# Patient Record
Sex: MALE | Race: OTHER | HISPANIC OR LATINO
[De-identification: names, ages, dates, MRNs, and addresses within clinical notes are randomized per-mention and may not be internally consistent; named-entity substitution may affect disease eponyms.]

---

## 2019-04-25 ENCOUNTER — APPOINTMENT (OUTPATIENT)
Dept: ORTHOPEDIC SURGERY | Facility: CLINIC | Age: 24
End: 2019-04-25

## 2020-12-13 ENCOUNTER — EMERGENCY (EMERGENCY)
Facility: HOSPITAL | Age: 25
LOS: 1 days | Discharge: LEFT BEFORE DISCHARGE | End: 2020-12-13
Attending: EMERGENCY MEDICINE | Admitting: EMERGENCY MEDICINE
Payer: COMMERCIAL

## 2020-12-13 VITALS
DIASTOLIC BLOOD PRESSURE: 72 MMHG | HEART RATE: 70 BPM | RESPIRATION RATE: 20 BRPM | TEMPERATURE: 99 F | SYSTOLIC BLOOD PRESSURE: 169 MMHG | HEIGHT: 67 IN | OXYGEN SATURATION: 100 %

## 2020-12-13 PROCEDURE — 99283 EMERGENCY DEPT VISIT LOW MDM: CPT

## 2020-12-13 NOTE — ED ADULT TRIAGE NOTE - CHIEF COMPLAINT QUOTE
pt had recent travel to [place]. bought in with mother for wandering thought and inability to concentrate. As per mother patient took 5 edibles yesterday. pt denies auditory or visual hallucinators, SI,HI ,ETOH use. pt had negative rapid covid test 2 days ago.     Clementina (mother) [phone] pt had recent travel to [place]. bought in with mother for wandering thoughts and inability to concentrate. As per mother patient took 5 edibles yesterday. pt denies auditory or visual hallucinators, SI,HI ,ETOH use. pt had negative rapid covid test 2 days ago.     Clementina (mother) [phone]

## 2020-12-14 LAB
ALBUMIN SERPL ELPH-MCNC: 4.7 G/DL — SIGNIFICANT CHANGE UP (ref 3.3–5)
ALP SERPL-CCNC: 61 U/L — SIGNIFICANT CHANGE UP (ref 40–120)
ALT FLD-CCNC: 22 U/L — SIGNIFICANT CHANGE UP (ref 4–41)
ANION GAP SERPL CALC-SCNC: 14 MMOL/L — SIGNIFICANT CHANGE UP (ref 7–14)
APAP SERPL-MCNC: <15 UG/ML — SIGNIFICANT CHANGE UP (ref 15–25)
AST SERPL-CCNC: 19 U/L — SIGNIFICANT CHANGE UP (ref 4–40)
BASOPHILS # BLD AUTO: 0.05 K/UL — SIGNIFICANT CHANGE UP (ref 0–0.2)
BASOPHILS NFR BLD AUTO: 0.7 % — SIGNIFICANT CHANGE UP (ref 0–2)
BILIRUB SERPL-MCNC: 0.5 MG/DL — SIGNIFICANT CHANGE UP (ref 0.2–1.2)
BUN SERPL-MCNC: 12 MG/DL — SIGNIFICANT CHANGE UP (ref 7–23)
CALCIUM SERPL-MCNC: 9.7 MG/DL — SIGNIFICANT CHANGE UP (ref 8.4–10.5)
CHLORIDE SERPL-SCNC: 103 MMOL/L — SIGNIFICANT CHANGE UP (ref 98–107)
CO2 SERPL-SCNC: 25 MMOL/L — SIGNIFICANT CHANGE UP (ref 22–31)
CREAT SERPL-MCNC: 0.75 MG/DL — SIGNIFICANT CHANGE UP (ref 0.5–1.3)
EOSINOPHIL # BLD AUTO: 0.02 K/UL — SIGNIFICANT CHANGE UP (ref 0–0.5)
EOSINOPHIL NFR BLD AUTO: 0.3 % — SIGNIFICANT CHANGE UP (ref 0–6)
ETHANOL SERPL-MCNC: <10 MG/DL — SIGNIFICANT CHANGE UP
GLUCOSE SERPL-MCNC: 98 MG/DL — SIGNIFICANT CHANGE UP (ref 70–99)
HCT VFR BLD CALC: 41.5 % — SIGNIFICANT CHANGE UP (ref 39–50)
HGB BLD-MCNC: 13.5 G/DL — SIGNIFICANT CHANGE UP (ref 13–17)
IANC: 3.91 K/UL — SIGNIFICANT CHANGE UP (ref 1.5–8.5)
IMM GRANULOCYTES NFR BLD AUTO: 0.3 % — SIGNIFICANT CHANGE UP (ref 0–1.5)
LYMPHOCYTES # BLD AUTO: 2.2 K/UL — SIGNIFICANT CHANGE UP (ref 1–3.3)
LYMPHOCYTES # BLD AUTO: 32.6 % — SIGNIFICANT CHANGE UP (ref 13–44)
MCHC RBC-ENTMCNC: 27.5 PG — SIGNIFICANT CHANGE UP (ref 27–34)
MCHC RBC-ENTMCNC: 32.5 GM/DL — SIGNIFICANT CHANGE UP (ref 32–36)
MCV RBC AUTO: 84.5 FL — SIGNIFICANT CHANGE UP (ref 80–100)
MONOCYTES # BLD AUTO: 0.54 K/UL — SIGNIFICANT CHANGE UP (ref 0–0.9)
MONOCYTES NFR BLD AUTO: 8 % — SIGNIFICANT CHANGE UP (ref 2–14)
NEUTROPHILS # BLD AUTO: 3.91 K/UL — SIGNIFICANT CHANGE UP (ref 1.8–7.4)
NEUTROPHILS NFR BLD AUTO: 58.1 % — SIGNIFICANT CHANGE UP (ref 43–77)
NRBC # BLD: 0 /100 WBCS — SIGNIFICANT CHANGE UP
NRBC # FLD: 0 K/UL — SIGNIFICANT CHANGE UP
PLATELET # BLD AUTO: 250 K/UL — SIGNIFICANT CHANGE UP (ref 150–400)
POTASSIUM SERPL-MCNC: 3.7 MMOL/L — SIGNIFICANT CHANGE UP (ref 3.5–5.3)
POTASSIUM SERPL-SCNC: 3.7 MMOL/L — SIGNIFICANT CHANGE UP (ref 3.5–5.3)
PROT SERPL-MCNC: 8 G/DL — SIGNIFICANT CHANGE UP (ref 6–8.3)
RBC # BLD: 4.91 M/UL — SIGNIFICANT CHANGE UP (ref 4.2–5.8)
RBC # FLD: 13.4 % — SIGNIFICANT CHANGE UP (ref 10.3–14.5)
SALICYLATES SERPL-MCNC: <5 MG/DL — LOW (ref 15–30)
SARS-COV-2 IGG SERPL QL IA: NEGATIVE — SIGNIFICANT CHANGE UP
SARS-COV-2 IGM SERPL IA-ACNC: <0.1 INDEX — SIGNIFICANT CHANGE UP
SARS-COV-2 RNA SPEC QL NAA+PROBE: SIGNIFICANT CHANGE UP
SODIUM SERPL-SCNC: 142 MMOL/L — SIGNIFICANT CHANGE UP (ref 135–145)
TSH SERPL-MCNC: 1.25 UIU/ML — SIGNIFICANT CHANGE UP (ref 0.27–4.2)
WBC # BLD: 6.74 K/UL — SIGNIFICANT CHANGE UP (ref 3.8–10.5)
WBC # FLD AUTO: 6.74 K/UL — SIGNIFICANT CHANGE UP (ref 3.8–10.5)

## 2020-12-14 NOTE — ED ADULT NURSE NOTE - CHIEF COMPLAINT QUOTE
pt had recent travel to [place]. bought in with mother for wandering thoughts and inability to concentrate. As per mother patient took 5 edibles yesterday. pt denies auditory or visual hallucinators, SI,HI ,ETOH use. pt had negative rapid covid test 2 days ago.     Clementina (mother) [phone]

## 2020-12-14 NOTE — ED PROVIDER NOTE - OBJECTIVE STATEMENT
25M PMH cerebral palsy, depression presenting for depressive symptoms. States it began 7 days ago--feels similar to his prior depressive episodes although this one is more intense. Traveled to [place] about 1.5 wks ago and returned 1 weeks ago where he was ingesting marijuana edibles on a daily basis from a dispensary. Was taking about 10mg daily although yest took a much larger amount. Endorses suicidal ideation with passive thoughts but denies any plan; also denies any homicidal ideation. States he has "trouble understanding his thoughts." Denies auditory/visual/tactile hallucinations. Also states he is not compliant with his medications. Ingested kratum 2 days ago as well

## 2020-12-14 NOTE — ED ADULT NURSE REASSESSMENT NOTE - NS ED NURSE REASSESS COMMENT FT1
Pt eloped with mother. mother  and patient states that patient feels better and no longer needs to stay. MD Finney, MICHAEL Holbrook and ANA Ignacio made aware. pt nad.

## 2020-12-14 NOTE — ED ADULT NURSE REASSESSMENT NOTE - NS ED NURSE REASSESS COMMENT FT1
Pt eloped at this time. Pt left with mother and was seen leaving by other RN in department. Pt without IV. MD and charge RN made aware.

## 2020-12-14 NOTE — ED PROVIDER NOTE - NS ED ROS FT
CONST: no fevers, no chills  EYES: , no vision changes  ENT: no sore throat  CV: no chest pain, no leg swelling  RESP: no shortness of breath, no cough  ABD: no abdominal pain, no nausea, no vomiting, no diarrhea  : no dysuria, no flank pain, no hematuria  MSK: no back pain, no extremity pain  NEURO: no headache or additional neurologic complaints  PSYCH: +depressive symptoms  SKIN:  no rash

## 2020-12-14 NOTE — ED PROVIDER NOTE - PHYSICAL EXAMINATION
Physical Exam:  Gen: NAD, non-toxic appearing, awake alert   Head: NCAT  HEENT: , normal conjunctiva, oral mucosa moist  Lung: CTAB, no respiratory distress, no wheezes/rhonchi/rales B/L, speaking in full sentences  CV: RRR, no murmurs, rubs or gallops  Abd: soft, NT, ND, no guarding, no rigidity, no rebound tenderness, no CVA tenderness   MSK: no visible deformities, ROM normal in UE/LE, no spinal tenderness   Neuro: No focal sensory or motor deficits  Skin: Warm, well perfused, no rash, no leg swelling  Psych: flat affect  ~Simón Valdes MD (PGY-1)

## 2020-12-14 NOTE — ED PROVIDER NOTE - ATTENDING CONTRIBUTION TO CARE
25 year old presents with mother for psych eval. per mother patient has been abusing edibles and kratom. no acute SI or HI.  will medically clear and consult psych.

## 2020-12-14 NOTE — ED ADULT NURSE NOTE - NS ED NURSE ELOPE COMMENTS
Pt eltaylor at 0250, was seen leaving by other RN in the department seen leaving with mother and driving away. ANM and physician aware at this time.

## 2020-12-14 NOTE — ED PROVIDER NOTE - PROGRESS NOTE DETAILS
patient left with mother without psych eval. will attempt to reach patient and mother to return to the ED to complete eval. Lucio STRANGE (PGY-1)   was informed by nurse in RW that patient and his mother were seen leaving ED. Called pt's cell # and spoke with both pt and his mother that I recommend they return to ED for further evaluation and management. They both stated pt felt safe to go home and that he felt better

## 2020-12-14 NOTE — ED ADULT NURSE NOTE - OBJECTIVE STATEMENT
Pt received to room 23 A&Ox4. PMHx of depression, presenting today with symptoms of depression. Pt states symptoms began 7 days ago. Pt denies SI and Hi at this time. States he ingested marijuana on a daily basis for the last 5 days. Pt denies any hallucinations at this time. Labs drawn as ordered. Will continue to monitor

## 2020-12-14 NOTE — ED PROVIDER NOTE - CLINICAL SUMMARY MEDICAL DECISION MAKING FREE TEXT BOX
25M presenting for depressive episodes in setting of marijuana edible use and kratum use  No acute findings on exam; pt has flat affect and endorses deprsessive symptoms that feel similar to his prior episodes  Plan: labs, ecg, serum, urine tox, psych consult

## 2022-05-08 ENCOUNTER — NON-APPOINTMENT (OUTPATIENT)
Age: 27
End: 2022-05-08

## 2023-05-12 ENCOUNTER — EMERGENCY (EMERGENCY)
Facility: HOSPITAL | Age: 28
LOS: 1 days | Discharge: HOME | End: 2023-05-12
Admitting: STUDENT IN AN ORGANIZED HEALTH CARE EDUCATION/TRAINING PROGRAM
Payer: COMMERCIAL

## 2023-05-12 VITALS
HEART RATE: 90 BPM | OXYGEN SATURATION: 100 % | TEMPERATURE: 98 F | SYSTOLIC BLOOD PRESSURE: 115 MMHG | RESPIRATION RATE: 16 BRPM | DIASTOLIC BLOOD PRESSURE: 72 MMHG

## 2023-05-12 DIAGNOSIS — F25.9 SCHIZOAFFECTIVE DISORDER, UNSPECIFIED: ICD-10-CM

## 2023-05-12 LAB
ALBUMIN SERPL ELPH-MCNC: 4.9 G/DL — SIGNIFICANT CHANGE UP (ref 3.3–5)
ALP SERPL-CCNC: 65 U/L — SIGNIFICANT CHANGE UP (ref 40–120)
ALT FLD-CCNC: 30 U/L — SIGNIFICANT CHANGE UP (ref 4–41)
AMPHET UR-MCNC: NEGATIVE — SIGNIFICANT CHANGE UP
ANION GAP SERPL CALC-SCNC: 15 MMOL/L — HIGH (ref 7–14)
APAP SERPL-MCNC: <10 UG/ML — LOW (ref 15–25)
APPEARANCE UR: CLEAR — SIGNIFICANT CHANGE UP
AST SERPL-CCNC: 26 U/L — SIGNIFICANT CHANGE UP (ref 4–40)
B PERT DNA SPEC QL NAA+PROBE: SIGNIFICANT CHANGE UP
B PERT+PARAPERT DNA PNL SPEC NAA+PROBE: SIGNIFICANT CHANGE UP
BARBITURATES UR SCN-MCNC: NEGATIVE — SIGNIFICANT CHANGE UP
BASOPHILS # BLD AUTO: 0.05 K/UL — SIGNIFICANT CHANGE UP (ref 0–0.2)
BASOPHILS NFR BLD AUTO: 0.9 % — SIGNIFICANT CHANGE UP (ref 0–2)
BENZODIAZ UR-MCNC: NEGATIVE — SIGNIFICANT CHANGE UP
BILIRUB SERPL-MCNC: 1.4 MG/DL — HIGH (ref 0.2–1.2)
BILIRUB UR-MCNC: NEGATIVE — SIGNIFICANT CHANGE UP
BORDETELLA PARAPERTUSSIS (RAPRVP): SIGNIFICANT CHANGE UP
BUN SERPL-MCNC: 9 MG/DL — SIGNIFICANT CHANGE UP (ref 7–23)
C PNEUM DNA SPEC QL NAA+PROBE: SIGNIFICANT CHANGE UP
CALCIUM SERPL-MCNC: 9.9 MG/DL — SIGNIFICANT CHANGE UP (ref 8.4–10.5)
CHLORIDE SERPL-SCNC: 100 MMOL/L — SIGNIFICANT CHANGE UP (ref 98–107)
CO2 SERPL-SCNC: 24 MMOL/L — SIGNIFICANT CHANGE UP (ref 22–31)
COCAINE METAB.OTHER UR-MCNC: NEGATIVE — SIGNIFICANT CHANGE UP
COLOR SPEC: YELLOW — SIGNIFICANT CHANGE UP
CREAT SERPL-MCNC: 0.55 MG/DL — SIGNIFICANT CHANGE UP (ref 0.5–1.3)
CREATININE URINE RESULT, DAU: 147 MG/DL — SIGNIFICANT CHANGE UP
DIFF PNL FLD: NEGATIVE — SIGNIFICANT CHANGE UP
EGFR: 138 ML/MIN/1.73M2 — SIGNIFICANT CHANGE UP
EOSINOPHIL # BLD AUTO: 0.04 K/UL — SIGNIFICANT CHANGE UP (ref 0–0.5)
EOSINOPHIL NFR BLD AUTO: 0.7 % — SIGNIFICANT CHANGE UP (ref 0–6)
ETHANOL SERPL-MCNC: <10 MG/DL — SIGNIFICANT CHANGE UP
FLUAV SUBTYP SPEC NAA+PROBE: SIGNIFICANT CHANGE UP
FLUBV RNA SPEC QL NAA+PROBE: SIGNIFICANT CHANGE UP
GLUCOSE SERPL-MCNC: 99 MG/DL — SIGNIFICANT CHANGE UP (ref 70–99)
GLUCOSE UR QL: NEGATIVE — SIGNIFICANT CHANGE UP
HADV DNA SPEC QL NAA+PROBE: SIGNIFICANT CHANGE UP
HCOV 229E RNA SPEC QL NAA+PROBE: SIGNIFICANT CHANGE UP
HCOV HKU1 RNA SPEC QL NAA+PROBE: SIGNIFICANT CHANGE UP
HCOV NL63 RNA SPEC QL NAA+PROBE: SIGNIFICANT CHANGE UP
HCOV OC43 RNA SPEC QL NAA+PROBE: SIGNIFICANT CHANGE UP
HCT VFR BLD CALC: 42.4 % — SIGNIFICANT CHANGE UP (ref 39–50)
HGB BLD-MCNC: 13.8 G/DL — SIGNIFICANT CHANGE UP (ref 13–17)
HMPV RNA SPEC QL NAA+PROBE: SIGNIFICANT CHANGE UP
HPIV1 RNA SPEC QL NAA+PROBE: SIGNIFICANT CHANGE UP
HPIV2 RNA SPEC QL NAA+PROBE: SIGNIFICANT CHANGE UP
HPIV3 RNA SPEC QL NAA+PROBE: SIGNIFICANT CHANGE UP
HPIV4 RNA SPEC QL NAA+PROBE: SIGNIFICANT CHANGE UP
IANC: 2.86 K/UL — SIGNIFICANT CHANGE UP (ref 1.8–7.4)
IMM GRANULOCYTES NFR BLD AUTO: 0.7 % — SIGNIFICANT CHANGE UP (ref 0–0.9)
KETONES UR-MCNC: ABNORMAL
LEUKOCYTE ESTERASE UR-ACNC: NEGATIVE — SIGNIFICANT CHANGE UP
LYMPHOCYTES # BLD AUTO: 2.27 K/UL — SIGNIFICANT CHANGE UP (ref 1–3.3)
LYMPHOCYTES # BLD AUTO: 39.3 % — SIGNIFICANT CHANGE UP (ref 13–44)
M PNEUMO DNA SPEC QL NAA+PROBE: SIGNIFICANT CHANGE UP
MCHC RBC-ENTMCNC: 27.3 PG — SIGNIFICANT CHANGE UP (ref 27–34)
MCHC RBC-ENTMCNC: 32.5 GM/DL — SIGNIFICANT CHANGE UP (ref 32–36)
MCV RBC AUTO: 83.8 FL — SIGNIFICANT CHANGE UP (ref 80–100)
METHADONE UR-MCNC: NEGATIVE — SIGNIFICANT CHANGE UP
MONOCYTES # BLD AUTO: 0.51 K/UL — SIGNIFICANT CHANGE UP (ref 0–0.9)
MONOCYTES NFR BLD AUTO: 8.8 % — SIGNIFICANT CHANGE UP (ref 2–14)
NEUTROPHILS # BLD AUTO: 2.86 K/UL — SIGNIFICANT CHANGE UP (ref 1.8–7.4)
NEUTROPHILS NFR BLD AUTO: 49.6 % — SIGNIFICANT CHANGE UP (ref 43–77)
NITRITE UR-MCNC: NEGATIVE — SIGNIFICANT CHANGE UP
NRBC # BLD: 0 /100 WBCS — SIGNIFICANT CHANGE UP (ref 0–0)
NRBC # FLD: 0 K/UL — SIGNIFICANT CHANGE UP (ref 0–0)
OPIATES UR-MCNC: NEGATIVE — SIGNIFICANT CHANGE UP
OXYCODONE UR-MCNC: NEGATIVE — SIGNIFICANT CHANGE UP
PCP SPEC-MCNC: SIGNIFICANT CHANGE UP
PCP UR-MCNC: NEGATIVE — SIGNIFICANT CHANGE UP
PH UR: 7 — SIGNIFICANT CHANGE UP (ref 5–8)
PLATELET # BLD AUTO: 274 K/UL — SIGNIFICANT CHANGE UP (ref 150–400)
POTASSIUM SERPL-MCNC: 3.8 MMOL/L — SIGNIFICANT CHANGE UP (ref 3.5–5.3)
POTASSIUM SERPL-SCNC: 3.8 MMOL/L — SIGNIFICANT CHANGE UP (ref 3.5–5.3)
PROT SERPL-MCNC: 7.8 G/DL — SIGNIFICANT CHANGE UP (ref 6–8.3)
PROT UR-MCNC: ABNORMAL
RAPID RVP RESULT: SIGNIFICANT CHANGE UP
RBC # BLD: 5.06 M/UL — SIGNIFICANT CHANGE UP (ref 4.2–5.8)
RBC # FLD: 13.7 % — SIGNIFICANT CHANGE UP (ref 10.3–14.5)
RSV RNA SPEC QL NAA+PROBE: SIGNIFICANT CHANGE UP
RV+EV RNA SPEC QL NAA+PROBE: SIGNIFICANT CHANGE UP
SALICYLATES SERPL-MCNC: <0.3 MG/DL — LOW (ref 15–30)
SARS-COV-2 RNA SPEC QL NAA+PROBE: SIGNIFICANT CHANGE UP
SODIUM SERPL-SCNC: 139 MMOL/L — SIGNIFICANT CHANGE UP (ref 135–145)
SP GR SPEC: 1.02 — SIGNIFICANT CHANGE UP (ref 1.01–1.05)
THC UR QL: POSITIVE
TOXICOLOGY SCREEN, DRUGS OF ABUSE, SERUM RESULT: SIGNIFICANT CHANGE UP
TSH SERPL-MCNC: 0.61 UIU/ML — SIGNIFICANT CHANGE UP (ref 0.27–4.2)
UROBILINOGEN FLD QL: SIGNIFICANT CHANGE UP
WBC # BLD: 5.77 K/UL — SIGNIFICANT CHANGE UP (ref 3.8–10.5)
WBC # FLD AUTO: 5.77 K/UL — SIGNIFICANT CHANGE UP (ref 3.8–10.5)

## 2023-05-12 PROCEDURE — 90792 PSYCH DIAG EVAL W/MED SRVCS: CPT

## 2023-05-12 PROCEDURE — 93010 ELECTROCARDIOGRAM REPORT: CPT

## 2023-05-12 PROCEDURE — 99285 EMERGENCY DEPT VISIT HI MDM: CPT

## 2023-05-12 NOTE — ED PROVIDER NOTE - CLINICAL SUMMARY MEDICAL DECISION MAKING FREE TEXT BOX
28 yo M with PMH of cerebral palsy, anxiety and depression, presents to ED c/o suicidal thoughts. Denies any current SI/HI. Plan: psych clearance lab, psych consult and reassess.

## 2023-05-12 NOTE — ED ADULT NURSE NOTE - OBJECTIVE STATEMENT
Received pt in  pt calm & cooperative c/o depression, anxiety & insomnia pt denies si/hi presently safety & comfort measures maintained eval on going.

## 2023-05-12 NOTE — ED PROVIDER NOTE - PATIENT PORTAL LINK FT
You can access the FollowMyHealth Patient Portal offered by [facility] by registering at the following website: [email or web address]. By joining [facility]’s FollowMyHealth portal, you will also be able to view your health information using other applications (apps) compatible with our system.

## 2023-05-12 NOTE — ED BEHAVIORAL HEALTH ASSESSMENT NOTE - SAFETY PLAN ADDT'L DETAILS
Safety plan discussed with.../Education provided regarding environmental safety / lethal means restriction/Provision of National Suicide Prevention Lifeline 3-369-946-PYQH (4666)

## 2023-05-12 NOTE — ED BEHAVIORAL HEALTH ASSESSMENT NOTE - RISK ASSESSMENT
Pt is at chronically elevated risk of harm to self given history of suicide attempt, chronic mental illness, history of recent discharge from a psychiatric facility.   Protective factors include no violence history, medication compliance, no access to guns, no substance abuse, supportive family, willingness to seek help, no suicidal ideation or homicidal ideation, identifies reasons for living, future-oriented.   Pt is not at acutely elevated risk of harm to self or others.

## 2023-05-12 NOTE — ED ADULT TRIAGE NOTE - CHIEF COMPLAINT QUOTE
Pt with hx of depression, anxiety c/o suicidal thoughts. Pt recently admitted to psych facility for 2 weeks. Pt denies plan, hallucinations at this time. States "If I don't sleep soon the voices will start."

## 2023-05-12 NOTE — ED BEHAVIORAL HEALTH ASSESSMENT NOTE - SUMMARY
27 year old male with h/o Schizoaffective Disorder, multiple past psych admissions (most recently at [facility] x 2 weeks---discharged 5/10/23), scheduled for intake at [facility] on 5/16/23, +h/o suicide attempt via overdose in April 2023, no h/o violence or legal issues, PMH cerebral palsy, no h/o substance abuse, brought in by mother for difficulty sleeping x 1-2 nights and possible SI.   Patient reports anxiety and poor sleep x 1-2 days. States he was concerned that he would develop SI if he continues to have difficulty sleeping, but he denies any current SI/denies any SI since discharge from the hospital on 5/10/23. He adamantly denies suicidal ideation, intent, or plan. He is future-oriented, identifies reasons for living, and engages in safety planning. He does not appear acutely psychotic, manic, or depressed. He denies homicidal or violent ideation, intent, or plan. He is cooperative and in good behavioral control throughout ED course. Pt does not present an acute danger to self or others and does not meet criteria for involuntary psychiatric admission at this time. Pt declines voluntary psychiatric admission at this time.

## 2023-05-12 NOTE — ED PROVIDER NOTE - NSFOLLOWUPINSTRUCTIONS_ED_ALL_ED_FT
Rest, drink plenty of fluids.  Advance activity as tolerated.  Continue all previously prescribed medications as directed.  Follow up with your primary care physician and psychiatrist in 48-72 hours- bring copies of your results.  Return to the ER for worsening or persistent symptoms, and/or ANY NEW OR CONCERNING SYMPTOMS. If you have issues obtaining follow up, please call: [phone] ([phone] to obtain a doctor or specialist who takes your insurance in your area.

## 2023-05-12 NOTE — ED BEHAVIORAL HEALTH ASSESSMENT NOTE - HPI (INCLUDE ILLNESS QUALITY, SEVERITY, DURATION, TIMING, CONTEXT, MODIFYING FACTORS, ASSOCIATED SIGNS AND SYMPTOMS)
27 year old male with h/o Schizoaffective Disorder, multiple past psych admissions (most recently at [facility] x 2 weeks---discharged 5/10/23), scheduled for intake at [facility] on 5/16/23, +h/o suicide attempt via overdose in April 2023, no h/o violence or legal issues, PMH cerebral palsy, no h/o substance abuse, brought in by mother for difficulty sleeping x 1-2 nights and possible SI.     Patient states he was discharged from [facility] two days ago (5/10/23). States that prior to that admission, he experienced prolonged period of insomnia and then developed SI. Patient states he asked his mother to bring him to the ED today (5/12/23) due to anxiety and poor sleep x 1-2 days. States he was concerned that he would develop SI (as he did prior to [facility] hospitalization) if he continues to have difficulty sleeping. However, he denies any current SI/denies any SI since discharge from the hospital on 5/10/23. He adamantly denies suicidal ideation, intent, or plan. States he intends to go to his intake appointment on Tuesday (5/16/23) at [facility] partial hospitalization program. Pt denies homicidal or violent ideation, intent, or plan. He denies paranoia, no delusional content elicited. He denies AH/VH. He denies manic symptoms. He denies persistently depressed mood or anhedonia. He reports compliance with Risperdal, Depakote, Cogentin. He denies substance use.   See  note for collateral from mother.

## 2023-05-12 NOTE — ED BEHAVIORAL HEALTH NOTE - BEHAVIORAL HEALTH NOTE
As per request of the provider, writer contacted patient’s Mom #[phone] Clementina to obtain collateral information.  Writer left a vm requesting a return call. As per request of the provider, writer contacted patient’s Mom #[phone] Clementina to obtain collateral information.  Writer left a  requesting a return call.    mother returned the call and provided the following information.    Patient is a 26 Yo male domiciled w/ mother, single, no children, hx of schizoaffective disorder, anxiety, not working or studying,  bib mother due to patient stating he did not feel safe and that he wouldn’t be okay today. She describes the patient as open and honest. She reports that patient was discharged from [facility] on 05/10/2023 after a 2 week admission. Since the discharge he has struggled to sleep which she identifies as a trigger for the patient going into dark thoughts which include ending his life. She reports patient had a suicide attempt and psych admission at [facility] in April after overdosing on pills . She reports patient does not endorse any AVH, paranoia or delusions. She reports patient is not connected to a psychiatrist or therapist currently. She reports the patient medication includes divalproex 500mg, benztropine .5mg and she thinks risperidone (unknown dosage) but says the patient can confirm medication. She reports patient received Ativan PRN while in the hospital. She reports the patient had multiple psych admissions while studying at [employer] [facility] [employer] 5+ years ago. She reports at baseline, patient would  play the piano, play games and run marathons. She reports he always had a social phobia but would interact. For the past 2 months patient has a bizarre head movement. Medical problems include  cerebral palsy and patient was born premature. She reports the patient has a twin. She reports patient is covid vaccinated and has no recent travel or exposure. She reports there is a family hx of mental illness on the patient’s father side. Patient does not have access to fire arms or weapons. mother is advocating for psych admission. Mother reports they almost called the ambulance last night because the patient had dry mouth, couldn’t swallow and couldn’t sleep. As per request of the provider, writer contacted patient’s Mom #[phone] Clementina to obtain collateral information.  Writer left a  requesting a return call.    mother returned the call and provided the following information.    Patient is a 28 Yo male domiciled w/ mother, single, no children, hx of schizoaffective disorder, anxiety, not working or studying,  bib mother due to patient stating he did not feel safe and that he wouldn’t be okay today. She describes the patient as open and honest. She reports that patient was discharged from [facility] on 05/10/2023 after a 2 week admission. Since the discharge he has struggled to sleep which she identifies as a trigger for the patient going into dark thoughts which include ending his life. She reports patient had a suicide attempt and psych admission at [facility] in April after overdosing on pills . She reports patient does not endorse any AVH, paranoia or delusions. She reports patient is not connected to a psychiatrist or therapist currently. She reports the patient medication includes divalproex 500mg, benztropine .5mg and she thinks risperidone (unknown dosage) but says the patient can confirm medication. She reports patient received Ativan PRN while in the hospital. She reports the patient had multiple psych admissions while studying at [employer] [facility] [employer] 5+ years ago. She reports at baseline, patient would  play the piano, play games and run marathons. She reports he always had a social phobia but would interact. For the past 2 months patient has a bizarre head movement. Medical problems include  cerebral palsy and patient was born premature. She reports the patient has a twin. She reports patient is covid vaccinated and has no recent travel or exposure. She reports there is a family hx of mental illness on the patient’s father side. Patient does not have access to fire arms or weapons. mother is advocating for psych admission. Mother reports they almost called the ambulance last night because the patient had dry mouth, couldn’t swallow and couldn’t sleep.    Writer informed patient's mother that patient was cleared for discharge. mother comfortable scheduling uber for the patient upon discharge. She confirmed patient has an apt w/ PHP program on tuesday. She is comfortable checking with the patient , safeguarding home and utilizing mobile crisis/ or returning to ED if symptoms worsen. Adequate: hears normal conversation without difficulty

## 2023-05-12 NOTE — ED PROVIDER NOTE - OBJECTIVE STATEMENT
26 yo M with PMH of cerebral palsy, anxiety and depression, presents to ED c/o suicidal thoughts. Reports he was recently discharge from [facility], taking his medication, but when he can't sleep, he develops suicidal thoughts. Denies any current SI, HI.

## 2023-05-12 NOTE — ED BEHAVIORAL HEALTH ASSESSMENT NOTE - DESCRIPTION
denies calm cooperative, in behavioral control throughout ED course  Vital Signs Last 24 Hrs  T(C): 36.7 (12 May 2023 15:00), Max: 36.7 (12 May 2023 15:00)  T(F): 98.1 (12 May 2023 15:00), Max: 98.1 (12 May 2023 15:00)  HR: 90 (12 May 2023 15:00) (90 - 90)  BP: 115/72 (12 May 2023 15:00) (115/72 - 115/72)  BP(mean): --  RR: 16 (12 May 2023 15:00) (16 - 16)  SpO2: 100% (12 May 2023 15:00) (100% - 100%) see HPI

## 2023-05-12 NOTE — ED BEHAVIORAL HEALTH ASSESSMENT NOTE - DETAILS
history of mental illness on father's side Spoke to mother informed mother see HPI call 911 or return to ED if symptoms worsen or if pt develops thoughts of harming self or others

## 2023-12-11 NOTE — ED ADULT TRIAGE NOTE - MEANS OF ARRIVAL
Medication Question or Refill    Contacts         Type Contact Phone/Fax    12/11/2023 01:05 PM CST Phone (Incoming) Presley Calvertphone (Emergency Contact) [phone]     Refill request HYDROcodone-acetaminophen (NORCO) 5-325 MG tablet            What medication are you calling about (include dose and sig)?: Refill request HYDROcodone-acetaminophen (NORCO) 5-325 MG tablet     Preferred Pharmacy:   [facility]  [address]  [facility] [address]  Phone: [phone] Fax: [phone] Alternate Fax: [phone]    [facility]  [address] [address]  Phone: [phone] Fax: [phone]    [id number] - [place], [place] - [address]  [address] [address]  Phone: [phone] Fax: [phone]    [facility] - [place], [place] - [address]  [address]  [facility] [address]  Phone: [phone] Fax: [phone]    [facility] [place], [place] - [address]  [address]  [address]  [facility] [address]  Phone: [phone] Fax: [phone]    [id number] - [place], [place] - [address] [place] [id number]  Phone: [phone] Fax: [phone]      Controlled Substance Agreement on file:   CSA -- Patient Level:     [Media Unavailable] Controlled Substance Agreement - Opioid - Scan on 6/13/2023  5:08 PM   [Media Unavailable] Controlled Substance Agreement - Opioid - Scan on 1/21/2022 12:19 PM   [Media Unavailable] Controlled Substance Agreement - Opioid - Scan on 12/6/2020  2:38 PM   [Media Unavailable] Controlled Substance Agreement - Opioid - Scan on 5/20/2019  6:59 AM       Who prescribed the medication?: PCP    Do you need a refill? Yes    When did you use the medication last? 2 days ago    Patient offered an appointment? No    Do you have any questions or  concerns?  No      Okay to leave a detailed message?: Yes at Cell number on file:    Telephone Information:   Mobile [phone]      ambulatory
